# Patient Record
Sex: FEMALE | Race: BLACK OR AFRICAN AMERICAN | NOT HISPANIC OR LATINO | Employment: FULL TIME | URBAN - METROPOLITAN AREA
[De-identification: names, ages, dates, MRNs, and addresses within clinical notes are randomized per-mention and may not be internally consistent; named-entity substitution may affect disease eponyms.]

---

## 2017-01-09 ENCOUNTER — OFFICE VISIT (OUTPATIENT)
Dept: INTERNAL MEDICINE | Facility: CLINIC | Age: 42
End: 2017-01-09

## 2017-01-09 VITALS
TEMPERATURE: 97.7 F | BODY MASS INDEX: 22.61 KG/M2 | DIASTOLIC BLOOD PRESSURE: 60 MMHG | OXYGEN SATURATION: 100 % | HEART RATE: 79 BPM | SYSTOLIC BLOOD PRESSURE: 94 MMHG | HEIGHT: 65 IN | WEIGHT: 135.7 LBS

## 2017-01-09 DIAGNOSIS — Z23 NEED FOR VACCINATION: ICD-10-CM

## 2017-01-09 DIAGNOSIS — Z12.31 VISIT FOR SCREENING MAMMOGRAM: ICD-10-CM

## 2017-01-09 DIAGNOSIS — R53.83 FATIGUE, UNSPECIFIED TYPE: Primary | ICD-10-CM

## 2017-01-09 DIAGNOSIS — D69.6 THROMBOCYTOPENIA (H): ICD-10-CM

## 2017-01-09 DIAGNOSIS — R05.8 POST-VIRAL COUGH SYNDROME: ICD-10-CM

## 2017-01-09 DIAGNOSIS — Z80.3 FH: BREAST CANCER IN FIRST DEGREE RELATIVE WHEN <50 YEARS OLD: ICD-10-CM

## 2017-01-09 DIAGNOSIS — K21.9 GASTROESOPHAGEAL REFLUX DISEASE WITHOUT ESOPHAGITIS: ICD-10-CM

## 2017-01-09 DIAGNOSIS — E55.9 VITAMIN D DEFICIENCY: ICD-10-CM

## 2017-01-09 DIAGNOSIS — L65.9 HAIR LOSS: ICD-10-CM

## 2017-01-09 DIAGNOSIS — E53.8 VITAMIN B12 DEFICIENCY (NON ANEMIC): ICD-10-CM

## 2017-01-09 PROCEDURE — 90471 IMMUNIZATION ADMIN: CPT | Performed by: INTERNAL MEDICINE

## 2017-01-09 PROCEDURE — 99214 OFFICE O/P EST MOD 30 MIN: CPT | Mod: 25 | Performed by: INTERNAL MEDICINE

## 2017-01-09 PROCEDURE — 90746 HEPB VACCINE 3 DOSE ADULT IM: CPT | Performed by: INTERNAL MEDICINE

## 2017-01-09 RX ORDER — PREDNISONE 20 MG/1
20 TABLET ORAL DAILY
Qty: 5 TABLET | Refills: 0 | Status: SHIPPED | OUTPATIENT
Start: 2017-01-09

## 2017-01-09 RX ORDER — CYANOCOBALAMIN 1000 UG/ML
2 INJECTION, SOLUTION INTRAMUSCULAR; SUBCUTANEOUS ONCE
Qty: 2 ML | Refills: 0 | OUTPATIENT
Start: 2017-01-09 | End: 2017-01-09

## 2017-01-09 RX ORDER — OMEPRAZOLE 40 MG/1
40 CAPSULE, DELAYED RELEASE ORAL 2 TIMES DAILY
Qty: 180 CAPSULE | Refills: 3 | Status: SHIPPED | OUTPATIENT
Start: 2017-01-09

## 2017-01-09 NOTE — MR AVS SNAPSHOT
After Visit Summary   1/9/2017    Donna Tom    MRN: [id number]           Patient Information     Date Of Birth          1975        Visit Information        Provider Department      1/9/2017 2:00 PM Buster Soliz MD [facility]        Today's Diagnoses     Fatigue, unspecified type    -  1     Thrombocytopenia (H)         Vitamin D deficiency         Gastroesophageal reflux disease without esophagitis         Hair loss         FH: breast cancer in first degree relative when <50 years old         Post-viral cough syndrome         Vitamin B12 deficiency (non anemic)         Need for vaccination         Visit for screening mammogram           Care Instructions    ** FOLLOW UP PLAN**:    PLEASE SCHEDULE LABS WITH OFFICE VISIT 3 MONTHS FROM TODAY TO FOLLOW UP ON  Vitamin Deficiencies  Fatigue, unspecified type  Gastroesophageal reflux disease without esophagitis  Hair loss  OTHER MEDICAL ISSUES, AND TO REVIEW TEST RESULTS    BE SURE TO SCHEDULE YOUR LAB DRAW APPOINTMENT FOR AT LEAST 1 WEEK BEFORE YOUR NEXT VISIT      YOU HAVE BEEN REFERRED FOR GYNE EXAM WITH ULYSSES FAGAN MD AND ALSO FOR GENETIC COUNSELING RE FH OF EARLY BREAST CANCER   PLEASE  MAKE SURE TO SCHEDULE GYNE APPOINTMENT(S) AT THE   OR BY CALLING THE NUMBER BELOW AND  ONCOLOGY APPOINTMENT(S) BY TELEPHONE      YOU MAY CONTACT THE CLINIC IF ANY QUESTIONS OR CONCERNS -[phone] OR VIA [facility]                   Follow-ups after your visit        Additional Services     ONC/HEME ADULT REFERRAL       Your provider has referred you to: [facility]: [facility] John Cruz [phone]   [email or web address]      Please be aware that coverage of these services is subject to the terms and limitations of your health insurance plan.  Call member services at your health plan with  any benefit or coverage questions.      Please bring the following to your appointment:    >>   Any x-rays, CTs or MRIs which have been performed.  Contact the facility where they were done to arrange for  prior to your scheduled appointment.  Any new CT, MRI or other procedures ordered by your specialist must be performed at a [facility] facility or coordinated by your clinic's referral office.    >>   List of current medications   >>   This referral request   >>   Any documents/labs given to you for this referral                  Future tests that were ordered for you today     Open Future Orders        Priority Expected Expires Ordered    CBC with platelets Routine 1/9/2017 7/9/2017 1/9/2017    Ferritin Routine 1/9/2017 7/9/2017 1/9/2017    Vitamin D Deficiency Routine 1/9/2017 7/9/2017 1/9/2017    Vitamin B12 Routine 1/9/2017 7/9/2017 1/9/2017    HEPATITIS B VACCINE,ADULT,IM Routine 6/9/2017 10/9/2017 1/9/2017    HEPATITIS B VACCINE,ADULT,IM Routine 2/9/2017 10/9/2017 1/9/2017    MA Screen Bilateral w/Jak Routine 1/9/2017 1/9/2018 1/9/2017            Who to contact     If you have questions or need follow up information about today's clinic visit or your schedule please contact [facility] directly at [phone].  Normal or non-critical lab and imaging results will be communicated to you by Open mHealthhart, letter or phone within 4 business days after the clinic has received the results. If you do not hear from us within 7 days, please contact the clinic through Open m[employer]hart or phone. If you have a critical or abnormal lab result, we will notify you by phone as soon as possible.  Submit refill requests through [employer] or call your pharmacy and they will forward the refill request to us. Please allow 3 business days for your refill to be completed.          Additional Information About Your Visit        [employer] Information     [employer] lets you send messages to your doctor, view your test  "results, renew your prescriptions, schedule appointments and more. To sign up, go to [email or web address] . Click on \"Log in\" on the left side of the screen, which will take you to the Welcome page. Then click on \"Sign up Now\" on the right side of the page.     You will be asked to enter the access code listed below, as well as some personal information. Please follow the directions to create your username and password.     Your access code is: [email or web address]  Expires: 2017  4:27 PM     Your access code will  in 90 days. If you need help or a new code, please call your [place] clinic or [phone].        Care EveryWhere ID     This is your Care EveryWhere ID. This could be used by other organizations to access your [facility] medical records  [id number]        Your Vitals Were     Pulse Temperature Height BMI (Body Mass Index) Pulse Oximetry Last Period    79 97.7  F (36.5  C) (Oral) 5' 5\" (1.651 m) 22.58 kg/m2 100% 2016    Breastfeeding?                   No            Blood Pressure from Last 3 Encounters:   17 94/60   16 124/66   16 105/65    Weight from Last 3 Encounters:   17 135 lb 11.2 oz (61.553 kg)   16 138 lb (62.596 kg)   16 135 lb (61.236 kg)              We Performed the Following     HEPATITIS B VACCINE, ADULT, IM     ONC/HEME ADULT REFERRAL          Today's Medication Changes          These changes are accurate as of: 17  3:03 PM.  If you have any questions, ask your nurse or doctor.               Start taking these medicines.        Dose/Directions    predniSONE 20 MG tablet   Commonly known as:  DELTASONE   Used for:  Post-viral cough syndrome   Started by:  Buster Soliz MD        Dose:  20 mg   Take 1 tablet (20 mg) by mouth daily   Quantity:  5 tablet   Refills:  0       PRENATAL LOW IRON 27-1 MG Tabs   Used for:  Fatigue, unspecified type, Hair loss   Started by:  Buster Soliz MD        Dose:  1 tablet   Take 1 tablet by " mouth daily INDICATION: VITAMIN SUPPLEMENTATION   Quantity:  90 tablet   Refills:  prn         These medicines have changed or have updated prescriptions.        Dose/Directions    * cyanocobalamin 1000 MCG/ML injection   Commonly known as:  VITAMIN B12   This may have changed:  You were already taking a medication with the same name, and this prescription was added. Make sure you understand how and when to take each.   Used for:  Vitamin B12 deficiency (non anemic)   Changed by:  Buster Soliz MD        Dose:  2 mL   Inject 2 mLs (2,000 mcg) into the muscle once for 1 dose   Quantity:  2 mL   Refills:  0       * Cyanocobalamin 5000 MCG/ML Liqd   Commonly known as:  B-12 SUPER STRENGTH   This may have changed:  Another medication with the same name was added. Make sure you understand how and when to take each.   Used for:  Fatigue, unspecified type, Vitamin B12 deficiency (non anemic)   Changed by:  Buster Soliz MD        Dose:  1 mL   Place 1 mL under the tongue every morning INDICATION: FOR VITAMIN B12 SUPPLEMENTATION:TO IMPROVE ENERGY, MEMORY, BALANCE, SLEEP AND MOOD, DIRECTIONS:PLEASE PLACE 1 cc UNDER THE TONGUE   Quantity:  1 Bottle   Refills:  PRN       * Notice:  This list has 2 medication(s) that are the same as other medications prescribed for you. Read the directions carefully, and ask your doctor or other care provider to review them with you.         Where to get your medicines      These medications were sent to [facility] [id number] [place] [address] AT [address] [place] [address] & [address] [address]    Hours:  24-hours Phone:  [phone]    - cholecalciferol 5000 UNITS Caps  - Cyanocobalamin 5000 MCG/ML Liqd  - omeprazole 40 MG capsule  - predniSONE 20 MG tablet  - PRENATAL LOW IRON 27-1 MG Tabs      Some of these will need a paper prescription and others can be bought over the counter.  Ask your nurse if you have questions.     You  don't need a prescription for these medications    - cyanocobalamin 1000 MCG/ML injection             Primary Care Provider Office Phone # Fax #    [facility] [phone]       [address] [place] [address] [address]  [facility] [address]        Thank you!     Thank you for choosing [facility]  for your care. Our goal is always to provide you with excellent care. Hearing back from our patients is one way we can continue to improve our services. Please take a few minutes to complete the written survey that you may receive in the mail after your visit with us. Thank you!             Your Updated Medication List - Protect others around you: Learn how to safely use, store and throw away your medicines at www.disposemymeds.org.          This list is accurate as of: 1/9/17  3:03 PM.  Always use your most recent med list.                   Brand Name Dispense Instructions for use    acetaminophen 325 MG tablet    TYLENOL    60 tablet    Take 3 tablets (975 mg) by mouth 3 times daily as needed for mild pain       Calcium Carb-Cholecalciferol 1000-800 MG-UNIT Tabs    CALCIUM 1000 + D    100 tablet    Take 1 tablet by mouth daily TAKE WITH FOOD, FOR BONE HEALTH AND FOR VITAMIN D SUPPLEMENTATION       cholecalciferol 5000 UNITS Caps     100 capsule    Take 1 capsule (5,000 Units) by mouth daily FOR VITAMIN D DEFICIENCY (LOW VITAMIN D)       * cyanocobalamin 1000 MCG/ML injection    VITAMIN B12    2 mL    Inject 2 mLs (2,000 mcg) into the muscle once for 1 dose       * Cyanocobalamin 5000 MCG/ML Liqd    B-12 SUPER STRENGTH    1 Bottle    Place 1 mL under the tongue every morning INDICATION: FOR VITAMIN B12 SUPPLEMENTATION:TO IMPROVE ENERGY, MEMORY, BALANCE, SLEEP AND MOOD, DIRECTIONS:PLEASE PLACE 1 cc UNDER THE TONGUE       fluticasone 50 MCG/ACT spray    FLONASE    16 g    Spray 2 sprays in nostril At Bedtime INDICATION: TO CONTROL NASAL ALLERGY SYMPTOMS, DO NOT BLOW NOSE FOR 30  MINUTES AFTER USING       omeprazole 40 MG capsule    priLOSEC    180 capsule    Take 1 capsule (40 mg) by mouth 2 times daily Take 30-60 minutes before a meal. INDICATION: TO CONTROL REFLUX SYMPTOMS       predniSONE 20 MG tablet    DELTASONE    5 tablet    Take 1 tablet (20 mg) by mouth daily       PRENATAL LOW IRON 27-1 MG Tabs     90 tablet    Take 1 tablet by mouth daily INDICATION: VITAMIN SUPPLEMENTATION       * Notice:  This list has 2 medication(s) that are the same as other medications prescribed for you. Read the directions carefully, and ask your doctor or other care provider to review them with you.

## 2017-01-09 NOTE — NURSING NOTE
"Chief Complaint   Patient presents with     Results     Labs done 12/13/16 and 12/14/16     RECHECK     LOV 12/21/16     Cough     Patient/info Update       Initial BP 94/60 mmHg  Pulse 79  Temp(Src) 97.7  F (36.5  C) (Oral)  Ht 5' 5\" (1.651 m)  Wt 135 lb 11.2 oz (61.553 kg)  BMI 22.58 kg/m2  SpO2 100%  LMP 11/30/2016  Breastfeeding? No Estimated body mass index is 22.58 kg/(m^2) as calculated from the following:    Height as of this encounter: 5' 5\" (1.651 m).    Weight as of this encounter: 135 lb 11.2 oz (61.553 kg).  BP completed using cuff size: alice Kat CMA      "

## 2017-01-09 NOTE — PROGRESS NOTES
"*Results-Labs done 12/13/16 and 12/14/16. She feels like her energy has improved after being on the high-dose Vitamin D.  *RECHECK-LOV 12/21/16  *Cough-X 3 weeks. No fever, chills, or wheezing, URI 3 weeks ago.  *Patient/Info Update-Advised she is due for pap, she plans to schedule this through her OB/GYN. She does not want to do mammogram (says she had one in 2013 or 2014 and it was so painful that she does not want to do another one).     Donna has agreed to have mammo done at [facility] due to  FH: breast cancer in first degree relative when <50 years old    Donna's most recent test results are as noted and addressed in the plan section of this note, and are significant for findings consistent with:   Vitamin D deficiency  Vitamin B12 deficiency (non anemic)  Low normal iron stores    Which could certainly account for a lot of the symptoms that she has been experiencing.    Donna reports feeling really good. She reports feeling better since she started taking vitamin supplements and has noted significant improvement with regards to fatigue and muscle aches and pains.        SUBJECTIVE:                                                    Donna Tom is a 41 year old female who presents to clinic today for the following health issues:      RESPIRATORY SYMPTOMS      Duration: About 3 weeks. She had URI, all of her other symptoms have improved, except the cough is still lingering.     Description  She did have cough, fever, sore throat. Now she just has cough.     Severity: moderate    Accompanying signs and symptoms: None    History (predisposing factors):  none    Precipitating or alleviating factors: None    Therapies tried and outcome:  Ibuprofen, theraflu (says she has taken \"all kinds of stuff over-the-counter\".        Other significant issues as outlined and addressed in the plan section of this note. She is also not immune to Hep B so will need revaccination.      Problem list and histories reviewed & " "adjusted, as indicated.  Additional history: as documented    Labs reviewed in EPIC  Problem list, Medication list, Allergies, and Medical/Social/Surgical histories reviewed in [facility] and updated as appropriate.    ROS:  14 point ROS negative except for lingering cough after a URI 3 weeks ago. No fever or chills.    OBJECTIVE:                                                    BP 94/60 mmHg  Pulse 79  Temp(Src) 97.7  F (36.5  C) (Oral)  Ht 5' 5\" (1.651 m)  Wt 135 lb 11.2 oz (61.553 kg)  BMI 22.58 kg/m2  SpO2 100%  LMP 11/30/2016  Breastfeeding? No  Body mass index is 22.58 kg/(m^2).  GENERAL: healthy, alert and no distress  EYES: Eyes grossly normal to inspection, PERRL and conjunctivae and sclerae normal  HENT: ear canals and TM's normal, nose and mouth without ulcers or lesions  NECK: no adenopathy, no asymmetry, masses, or scars and thyroid normal to palpation  RESP: lungs clear to auscultation - no rales, rhonchi or wheezes  CV: regular rate and rhythm, normal S1 S2, no S3 or S4, no murmur, click or rub, no peripheral edema and peripheral pulses strong  ABDOMEN: soft, nontender, no hepatosplenomegaly, no masses and bowel sounds normal  MS: no gross musculoskeletal defects noted, no edema    Diagnostic Test Results:  Results for orders placed or performed in visit on 12/21/16   Fecal colorectal cancer screen FIT   Result Value Ref Range    Occult Blood Scn FIT Negative NEG        ASSESSMENT/PLAN:                                                      DIAGNOSIS/PLAN:     ICD-10-CM    1. Fatigue, unspecified type R53.83 cholecalciferol 5000 UNITS CAPS     Prenatal Vit-Fe Fumarate-FA (PRENATAL LOW IRON) 27-1 MG TABS     Cyanocobalamin (B-12 SUPER STRENGTH) 5000 MCG/ML LIQD     Ferritin     Vitamin D Deficiency     Vitamin B12    AT RISK FOR B12 DEFICIENCY   2. Thrombocytopenia (H) D69.6 CBC with platelets   3. Vitamin D deficiency E55.9 cholecalciferol 5000 UNITS CAPS     Vitamin D Deficiency    NO DAIRY INTAKE "   4. Gastroesophageal reflux disease without esophagitis K21.9 omeprazole (PRILOSEC) 40 MG capsule    H PYLORI NEGATIVE   5. Hair loss L65.9 Prenatal Vit-Fe Fumarate-FA (PRENATAL LOW IRON) 27-1 MG TABS     Ferritin   6. FH: breast cancer in first degree relative when <50 years old Z80.3 MA Screen Bilateral w/Jak     ONC/HEME ADULT REFERRAL   7. Post-viral cough syndrome R05 predniSONE (DELTASONE) 20 MG tablet   8. Vitamin B12 deficiency (non anemic) E53.8 cyanocobalamin (VITAMIN B12) 1000 MCG/ML injection     Cyanocobalamin (B-12 SUPER STRENGTH) 5000 MCG/ML LIQD     Vitamin B12   9. Need for vaccination Z23 HEPATITIS B VACCINE,ADULT,IM     HEPATITIS B VACCINE,ADULT,IM     HEPATITIS B VACCINE, ADULT, IM   10. Visit for screening mammogram Z12.31 MA Screen Bilateral w/Jak       SIGNIFICANT ISSUES RE The primary encounter diagnosis was Fatigue, unspecified type. Diagnoses of Thrombocytopenia (H), Vitamin D deficiency, Gastroesophageal reflux disease without esophagitis, Hair loss, FH: breast cancer in first degree relative when <50 years old, Post-viral cough syndrome, Vitamin B12 deficiency (non anemic), Need for vaccination, and Visit for screening mammogram were also pertinent to this visit. AS NOTED AND ADDRESSED ABOVE   MEDS AND AND LABS AS ORDERED TO ADDRESS PREVIOUS AND CURRENT ABNORMAL INDICES    SEE PATIENT INSTRUCTION SECTION FOR FOLLOW UP PLAN    Donna IS TO CONTINUE OTHER TREATMENT REGIMEN/PLANS EXCEPT AS INDICATED    COMPLIANCE WITH MEDICATIONS DIET AND EXERCISE PLANS ENCOURAGED    DISCONTINUED MEDS:  Medications Discontinued During This Encounter   Medication Reason     cholecalciferol 5000 UNITS CAPS Reorder     omeprazole (PRILOSEC) 40 MG capsule Reorder     Cyanocobalamin (B-12 SUPER STRENGTH) 5000 MCG/ML LIQD Reorder       CURRENT MED LIST WITH CHANGES AS NOTED BELOW:  Current Outpatient Prescriptions   Medication Sig Dispense Refill     cholecalciferol 5000 UNITS CAPS Take 1 capsule (5,000 Units)  by mouth daily FOR VITAMIN D DEFICIENCY (LOW VITAMIN D) 100 capsule 3     Prenatal Vit-Fe Fumarate-FA (PRENATAL LOW IRON) 27-1 MG TABS Take 1 tablet by mouth daily INDICATION: VITAMIN SUPPLEMENTATION 90 tablet prn     omeprazole (PRILOSEC) 40 MG capsule Take 1 capsule (40 mg) by mouth 2 times daily Take 30-60 minutes before a meal. INDICATION: TO CONTROL REFLUX SYMPTOMS 180 capsule 3     cyanocobalamin (VITAMIN B12) 1000 MCG/ML injection Inject 2 mLs (2,000 mcg) into the muscle once for 1 dose 2 mL 0     Cyanocobalamin (B-12 SUPER STRENGTH) 5000 MCG/ML LIQD Place 1 mL under the tongue every morning INDICATION: FOR VITAMIN B12 SUPPLEMENTATION:TO IMPROVE ENERGY, MEMORY, BALANCE, SLEEP AND MOOD, DIRECTIONS:PLEASE PLACE 1 cc UNDER THE TONGUE 1 Bottle PRN     predniSONE (DELTASONE) 20 MG tablet Take 1 tablet (20 mg) by mouth daily 5 tablet 0     Calcium Carb-Cholecalciferol (CALCIUM 1000 + D) 1000-800 MG-UNIT TABS Take 1 tablet by mouth daily TAKE WITH FOOD, FOR BONE HEALTH AND FOR VITAMIN D SUPPLEMENTATION 100 tablet PRN     fluticasone (FLONASE) 50 MCG/ACT spray Spray 2 sprays in nostril At Bedtime INDICATION: TO CONTROL NASAL ALLERGY SYMPTOMS, DO NOT BLOW NOSE FOR 30 MINUTES AFTER USING 16 g PRN     acetaminophen (TYLENOL) 325 MG tablet Take 3 tablets (975 mg) by mouth 3 times daily as needed for mild pain 60 tablet 0         Office visit time > 25 mins, greater than 50% of which was spent obtaining history, reviewing medications, counseling re need for compliance with meds and expectations regarding treatment, discussion of treatment, follow up plans, and coordination of care.       Patient Instructions   ** FOLLOW UP PLAN**:    PLEASE SCHEDULE LABS WITH OFFICE VISIT 3 MONTHS FROM TODAY TO FOLLOW UP ON  Vitamin Deficiencies  Fatigue, unspecified type  Gastroesophageal reflux disease without esophagitis  Hair loss  OTHER MEDICAL ISSUES, AND TO REVIEW TEST RESULTS    BE SURE TO SCHEDULE YOUR LAB DRAW APPOINTMENT FOR AT  LEAST 1 WEEK BEFORE YOUR NEXT VISIT      YOU HAVE BEEN REFERRED FOR GYNE EXAM WITH ULYSSES FAGAN MD AND ALSO FOR GENETIC COUNSELING RE FH OF EARLY BREAST CANCER   PLEASE  MAKE SURE TO SCHEDULE GYNE APPOINTMENT(S) AT THE   OR BY CALLING THE NUMBER BELOW AND  ONCOLOGY APPOINTMENT(S) BY TELEPHONE      YOU MAY CONTACT THE CLINIC IF ANY QUESTIONS OR CONCERNS -[phone] OR [facility]  [facility]

## 2018-12-08 ENCOUNTER — COMMUNICATION - HEALTHEAST (OUTPATIENT)
Dept: SCHEDULING | Facility: CLINIC | Age: 43
End: 2018-12-08

## 2023-06-14 ENCOUNTER — LAB REQUISITION (OUTPATIENT)
Dept: LAB | Facility: CLINIC | Age: 48
End: 2023-06-14

## 2023-06-14 DIAGNOSIS — O09.519 SUPERVISION OF ELDERLY PRIMIGRAVIDA, UNSPECIFIED TRIMESTER: ICD-10-CM

## 2023-06-14 LAB
ABO/RH(D): NORMAL
ANTIBODY SCREEN: NEGATIVE
BASOPHILS # BLD AUTO: 0 10E3/UL (ref 0–0.2)
BASOPHILS NFR BLD AUTO: 0 %
EOSINOPHIL # BLD AUTO: 0.1 10E3/UL (ref 0–0.7)
EOSINOPHIL NFR BLD AUTO: 1 %
ERYTHROCYTE [DISTWIDTH] IN BLOOD BY AUTOMATED COUNT: 13.7 % (ref 10–15)
HCT VFR BLD AUTO: 41.8 % (ref 35–47)
HGB BLD-MCNC: 13.1 G/DL (ref 11.7–15.7)
IMM GRANULOCYTES # BLD: 0 10E3/UL
IMM GRANULOCYTES NFR BLD: 0 %
LYMPHOCYTES # BLD AUTO: 2.9 10E3/UL (ref 0.8–5.3)
LYMPHOCYTES NFR BLD AUTO: 39 %
MCH RBC QN AUTO: 29.5 PG (ref 26.5–33)
MCHC RBC AUTO-ENTMCNC: 31.3 G/DL (ref 31.5–36.5)
MCV RBC AUTO: 94 FL (ref 78–100)
MONOCYTES # BLD AUTO: 0.5 10E3/UL (ref 0–1.3)
MONOCYTES NFR BLD AUTO: 6 %
NEUTROPHILS # BLD AUTO: 3.9 10E3/UL (ref 1.6–8.3)
NEUTROPHILS NFR BLD AUTO: 54 %
NRBC # BLD AUTO: 0 10E3/UL
NRBC BLD AUTO-RTO: 0 /100
PLATELET # BLD AUTO: 202 10E3/UL (ref 150–450)
RBC # BLD AUTO: 4.44 10E6/UL (ref 3.8–5.2)
SPECIMEN EXPIRATION DATE: NORMAL
WBC # BLD AUTO: 7.4 10E3/UL (ref 4–11)

## 2023-06-14 PROCEDURE — 86901 BLOOD TYPING SEROLOGIC RH(D): CPT | Performed by: NURSE PRACTITIONER

## 2023-06-14 PROCEDURE — 85025 COMPLETE CBC W/AUTO DIFF WBC: CPT | Performed by: NURSE PRACTITIONER

## 2023-06-14 PROCEDURE — 86803 HEPATITIS C AB TEST: CPT | Performed by: NURSE PRACTITIONER

## 2023-06-14 PROCEDURE — 86850 RBC ANTIBODY SCREEN: CPT | Performed by: NURSE PRACTITIONER

## 2023-06-14 PROCEDURE — 87340 HEPATITIS B SURFACE AG IA: CPT | Performed by: NURSE PRACTITIONER

## 2023-06-15 LAB
HBV SURFACE AG SERPL QL IA: NONREACTIVE
HCV AB SERPL QL IA: NONREACTIVE

## 2023-06-22 ENCOUNTER — TRANSFERRED RECORDS (OUTPATIENT)
Dept: HEALTH INFORMATION MANAGEMENT | Facility: CLINIC | Age: 48
End: 2023-06-22
Payer: COMMERCIAL

## 2023-06-23 ENCOUNTER — TRANSCRIBE ORDERS (OUTPATIENT)
Dept: MATERNAL FETAL MEDICINE | Facility: CLINIC | Age: 48
End: 2023-06-23

## 2023-06-23 DIAGNOSIS — O26.90 PREGNANCY RELATED CONDITION, ANTEPARTUM: Primary | ICD-10-CM

## 2023-07-07 ENCOUNTER — TRANSFERRED RECORDS (OUTPATIENT)
Dept: HEALTH INFORMATION MANAGEMENT | Facility: CLINIC | Age: 48
End: 2023-07-07
Payer: COMMERCIAL

## 2023-07-07 ENCOUNTER — PRE VISIT (OUTPATIENT)
Dept: MATERNAL FETAL MEDICINE | Facility: CLINIC | Age: 48
End: 2023-07-07
Payer: COMMERCIAL

## 2023-07-14 ENCOUNTER — OFFICE VISIT (OUTPATIENT)
Dept: MATERNAL FETAL MEDICINE | Facility: CLINIC | Age: 48
End: 2023-07-14
Attending: OBSTETRICS & GYNECOLOGY
Payer: COMMERCIAL

## 2023-07-14 ENCOUNTER — HOSPITAL ENCOUNTER (OUTPATIENT)
Dept: ULTRASOUND IMAGING | Facility: CLINIC | Age: 48
Discharge: HOME | End: 2023-07-14
Attending: OBSTETRICS & GYNECOLOGY
Payer: COMMERCIAL

## 2023-07-14 DIAGNOSIS — O09.522 MULTIGRAVIDA OF ADVANCED MATERNAL AGE IN SECOND TRIMESTER: Primary | ICD-10-CM

## 2023-07-14 DIAGNOSIS — O26.90 PREGNANCY RELATED CONDITION, ANTEPARTUM: ICD-10-CM

## 2023-07-14 PROCEDURE — 76811 OB US DETAILED SNGL FETUS: CPT | Mod: 26 | Performed by: OBSTETRICS & GYNECOLOGY

## 2023-07-14 PROCEDURE — 76811 OB US DETAILED SNGL FETUS: CPT

## 2023-07-14 PROCEDURE — 99213 OFFICE O/P EST LOW 20 MIN: CPT | Mod: 25 | Performed by: OBSTETRICS & GYNECOLOGY

## 2023-07-14 NOTE — NURSING NOTE
Patient reports starting to fetal movement, no pain, no contractions, leaking of fluid, or bleeding. SBAR given to JELLY JORGE, see their note in Epic.

## 2023-07-20 ENCOUNTER — LAB REQUISITION (OUTPATIENT)
Dept: LAB | Facility: CLINIC | Age: 48
End: 2023-07-20

## 2023-07-20 DIAGNOSIS — Z3A.19 19 WEEKS GESTATION OF PREGNANCY: ICD-10-CM

## 2023-07-20 PROCEDURE — 82105 ALPHA-FETOPROTEIN SERUM: CPT | Performed by: OBSTETRICS & GYNECOLOGY

## 2023-07-22 LAB
# FETUSES US: NORMAL
AFP MOM SERPL: 1.23
AFP SERPL-MCNC: 73 NG/ML
AGE - REPORTED: 50 YR
CURRENT SMOKER: NO
FAMILY MEMBER DISEASES HX: NO
GA METHOD: NORMAL
GA: NORMAL WK
IDDM PATIENT QL: NO
INTEGRATED SCN PATIENT-IMP: NORMAL
SPECIMEN DRAWN SERPL: NORMAL

## 2023-08-07 ENCOUNTER — OFFICE VISIT (OUTPATIENT)
Dept: MATERNAL FETAL MEDICINE | Facility: CLINIC | Age: 48
End: 2023-08-07
Attending: OBSTETRICS & GYNECOLOGY
Payer: COMMERCIAL

## 2023-08-07 ENCOUNTER — HOSPITAL ENCOUNTER (OUTPATIENT)
Dept: ULTRASOUND IMAGING | Facility: CLINIC | Age: 48
Discharge: HOME | End: 2023-08-07
Attending: OBSTETRICS & GYNECOLOGY
Payer: COMMERCIAL

## 2023-08-07 DIAGNOSIS — O09.812 PREGNANCY RESULTING FROM IN VITRO FERTILIZATION IN SECOND TRIMESTER: Primary | ICD-10-CM

## 2023-08-07 DIAGNOSIS — O26.90 PREGNANCY RELATED CONDITION, ANTEPARTUM: ICD-10-CM

## 2023-08-07 PROCEDURE — 76825 ECHO EXAM OF FETAL HEART: CPT | Mod: 26 | Performed by: OBSTETRICS & GYNECOLOGY

## 2023-08-07 PROCEDURE — 93325 DOPPLER ECHO COLOR FLOW MAPG: CPT | Mod: 26 | Performed by: OBSTETRICS & GYNECOLOGY

## 2023-08-07 PROCEDURE — 93325 DOPPLER ECHO COLOR FLOW MAPG: CPT

## 2023-08-07 PROCEDURE — 76827 ECHO EXAM OF FETAL HEART: CPT | Mod: 26 | Performed by: OBSTETRICS & GYNECOLOGY

## 2023-08-07 NOTE — PROGRESS NOTES
Please see full imaging report from ViewPoint program under imaging tab.    Krishna Vital MD  Maternal Fetal Medicine

## 2023-08-07 NOTE — NURSING NOTE
Patient presents to M for Fetal echo. Positive fetal movement. Denies LOF, vaginal bleeding or cramping/contractions. SBAR given to MFM MD, see their note in Epic.